# Patient Record
Sex: FEMALE | URBAN - METROPOLITAN AREA
[De-identification: names, ages, dates, MRNs, and addresses within clinical notes are randomized per-mention and may not be internally consistent; named-entity substitution may affect disease eponyms.]

---

## 2019-11-25 ENCOUNTER — OFFICE VISIT (OUTPATIENT)
Dept: OBGYN CLINIC | Facility: CLINIC | Age: 29
End: 2019-11-25

## 2019-11-25 VITALS
HEART RATE: 114 BPM | WEIGHT: 139.63 LBS | SYSTOLIC BLOOD PRESSURE: 110 MMHG | BODY MASS INDEX: 20.45 KG/M2 | HEIGHT: 69.29 IN | DIASTOLIC BLOOD PRESSURE: 60 MMHG

## 2019-11-25 DIAGNOSIS — Z01.419 ENCOUNTER FOR WELL WOMAN EXAM WITH ROUTINE GYNECOLOGICAL EXAM: Primary | ICD-10-CM

## 2019-11-25 DIAGNOSIS — Z12.4 CERVICAL CANCER SCREENING: ICD-10-CM

## 2019-11-25 PROCEDURE — 99385 PREV VISIT NEW AGE 18-39: CPT | Performed by: OBSTETRICS & GYNECOLOGY

## 2019-11-25 PROCEDURE — 87624 HPV HI-RISK TYP POOLED RSLT: CPT | Performed by: OBSTETRICS & GYNECOLOGY

## 2019-11-25 PROCEDURE — 88175 CYTOPATH C/V AUTO FLUID REDO: CPT | Performed by: OBSTETRICS & GYNECOLOGY

## 2019-11-25 NOTE — PROGRESS NOTES
Dane Romano is a 34year old female  Patient's last menstrual period was 2019. Patient presents with:  Wellness Visit  . Patient has no complaints, never had pap smear    OBSTETRICS HISTORY:  OB History    Para Term  AB Living Topics      Concerns:        Not on file    Social History Narrative      Not on file      FAMILY HISTORY:  Family History   Problem Relation Age of Onset   • No Known Problems Father    • No Known Problems Mother    • No Known Problems Maternal Grandmothe prolapse  Bladder:  No fullness, masses or tenderness  Vagina:  Normal appearance without lesions, no abnormal discharge  Cervix:  Normal without tenderness on motion  Uterus: normal in size, contour, position, mobility, without tenderness  Adnexa: normal

## 2019-12-10 ENCOUNTER — OFFICE VISIT (OUTPATIENT)
Dept: SURGERY | Facility: CLINIC | Age: 29
End: 2019-12-10

## 2019-12-10 VITALS — TEMPERATURE: 98 F | HEART RATE: 108 BPM | DIASTOLIC BLOOD PRESSURE: 83 MMHG | SYSTOLIC BLOOD PRESSURE: 131 MMHG

## 2019-12-10 DIAGNOSIS — N34.2 URETHRITIS: ICD-10-CM

## 2019-12-10 DIAGNOSIS — R82.90 URINE FINDINGS ABNORMAL: Primary | ICD-10-CM

## 2019-12-10 PROCEDURE — 99203 OFFICE O/P NEW LOW 30 MIN: CPT | Performed by: UROLOGY

## 2019-12-10 PROCEDURE — 81003 URINALYSIS AUTO W/O SCOPE: CPT | Performed by: UROLOGY

## 2019-12-10 NOTE — PROGRESS NOTES
Rooming Clinician:     Nikos Hackett is a 34year old female. Patient presents with:  Urinary Symptoms: symptoms have subsided        HPI:     Patient comes to the office with her boyfriend who acts as .   Patient states that she recently ha lesions  HEENT: atraumatic, normocephalic,ears and throat are clear  NECK: supple  LUNGS: normal respiratory motion without distress  CARDIO: normal peripheral perfusion  GI: no mass, tenderness or organomegaly  BACK: no CVA tenderness  NEURO: grossly norm abnormal  -     URINALYSIS, AUTO, W/O SCOPE    Urethritis        Follow up examination in 2 months for PVR and possible urethral calibration and dilation    The patient indicates understanding of these issues and agrees to the plan.     Alka Stephen M.D.

## (undated) NOTE — MR AVS SNAPSHOT
After Visit Summary   11/25/2019    Lane Dorsey    MRN: [id number]           Visit Information     Date & Time  11/25/2019  2:45 PM Provider  Glenny Palumbo DO [facility]  [address]  Dept.  Phone  [phone]      Your V [email or web address]                   DO YOU KNOW WHERE TO GO? Injury & illness are never convenient. If you are dealing with a   non-emergency, consider your options before heading to an ER.          SAME DAY  APPOINTMENTS  Availa